# Patient Record
Sex: MALE | Race: WHITE | URBAN - METROPOLITAN AREA
[De-identification: names, ages, dates, MRNs, and addresses within clinical notes are randomized per-mention and may not be internally consistent; named-entity substitution may affect disease eponyms.]

---
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
## 2019-06-05 ENCOUNTER — LABORATORY RESULT (OUTPATIENT)
Age: 19
End: 2019-06-05

## 2019-06-05 ENCOUNTER — APPOINTMENT (OUTPATIENT)
Dept: PULMONOLOGY | Facility: CLINIC | Age: 19
End: 2019-06-05
Payer: COMMERCIAL

## 2019-06-05 VITALS
OXYGEN SATURATION: 98 % | HEIGHT: 70 IN | DIASTOLIC BLOOD PRESSURE: 74 MMHG | WEIGHT: 130 LBS | SYSTOLIC BLOOD PRESSURE: 113 MMHG | HEART RATE: 73 BPM | BODY MASS INDEX: 18.61 KG/M2

## 2019-06-05 DIAGNOSIS — L03.317 CELLULITIS OF BUTTOCK: ICD-10-CM

## 2019-06-05 PROCEDURE — 99205 OFFICE O/P NEW HI 60 MIN: CPT | Mod: 25

## 2019-06-05 PROCEDURE — 94727 GAS DIL/WSHOT DETER LNG VOL: CPT

## 2019-06-05 PROCEDURE — 94729 DIFFUSING CAPACITY: CPT

## 2019-06-05 PROCEDURE — 88738 HGB QUANT TRANSCUTANEOUS: CPT

## 2019-06-05 PROCEDURE — 94060 EVALUATION OF WHEEZING: CPT

## 2019-06-05 PROCEDURE — 36415 COLL VENOUS BLD VENIPUNCTURE: CPT

## 2019-06-05 RX ORDER — DOXYCYCLINE HYCLATE 100 MG/1
100 TABLET ORAL
Qty: 14 | Refills: 0 | Status: ACTIVE | COMMUNITY
Start: 2019-06-05 | End: 1900-01-01

## 2019-06-05 NOTE — DISCUSSION/SUMMARY
[FreeTextEntry1] : Right buttock bug bite, rule out Lyme's disease. Secondly, his admission with evidence of asthma possible secondary to marijuana smoking

## 2019-06-05 NOTE — PHYSICAL EXAM
[General Appearance - Well Developed] : well developed [Normal Appearance] : normal appearance [Well Groomed] : well groomed [General Appearance - Well Nourished] : well nourished [No Deformities] : no deformities [General Appearance - In No Acute Distress] : no acute distress [Normal Oropharynx] : normal oropharynx [Heart Rate And Rhythm] : heart rate and rhythm were normal [Heart Sounds] : normal S1 and S2 [Murmurs] : no murmurs present [FreeTextEntry1] : erythematous rash on right buttock [Nail Clubbing] : no clubbing of the fingernails [Cyanosis, Localized] : no localized cyanosis [Petechial Hemorrhages (___cm)] : no petechial hemorrhages [] : no ischemic changes

## 2019-06-05 NOTE — REASON FOR VISIT
[Initial Evaluation] : an initial evaluation [Cough] : cough [FreeTextEntry2] : Bug bite on right buttock for the last 4 days

## 2019-06-05 NOTE — ASSESSMENT
[FreeTextEntry1] : Venipuncture with labs drawn in office\par Doxycycline for 7 days prescribed.\par Starting him on Symbicort 160/12.5 mcg HFA. \par Strongly advise him on marijuana smoking cessation

## 2019-06-05 NOTE — PROCEDURE
[FreeTextEntry1] : Pulmonary function test performed in my office today: Positive was within normal limits with air trapping; spirometry shows mild obstructive airway disease with significant improvement postbronchodilator, consistent with a reversible bronchospastic disease (asthma); diffusion was within normal limits. SpO2 at rest on room air 98%

## 2019-06-17 LAB
ALBUMIN SERPL ELPH-MCNC: 5 G/DL
ALP BLD-CCNC: 116 U/L
ALT SERPL-CCNC: 10 U/L
ANION GAP SERPL CALC-SCNC: 14 MMOL/L
AST SERPL-CCNC: 17 U/L
B BURGDOR IGG+IGM SER QL IB: NORMAL
BASOPHILS # BLD AUTO: 0.04 K/UL
BASOPHILS NFR BLD AUTO: 0.8 %
BILIRUB SERPL-MCNC: 1.2 MG/DL
BUN SERPL-MCNC: 11 MG/DL
CALCIUM SERPL-MCNC: 9.5 MG/DL
CHLORIDE SERPL-SCNC: 104 MMOL/L
CO2 SERPL-SCNC: 23 MMOL/L
CREAT SERPL-MCNC: 0.86 MG/DL
EOSINOPHIL # BLD AUTO: 0.26 K/UL
EOSINOPHIL NFR BLD AUTO: 5.4 %
ERYTHROCYTE [SEDIMENTATION RATE] IN BLOOD BY WESTERGREN METHOD: 2 MM/HR
GLUCOSE SERPL-MCNC: 86 MG/DL
HCT VFR BLD CALC: 49.2 %
HGB BLD-MCNC: 15.6 G/DL
IMM GRANULOCYTES NFR BLD AUTO: 0.2 %
LYMPHOCYTES # BLD AUTO: 1.78 K/UL
LYMPHOCYTES NFR BLD AUTO: 37 %
MAN DIFF?: NORMAL
MCHC RBC-ENTMCNC: 30.2 PG
MCHC RBC-ENTMCNC: 31.7 GM/DL
MCV RBC AUTO: 95.2 FL
MONOCYTES # BLD AUTO: 0.34 K/UL
MONOCYTES NFR BLD AUTO: 7.1 %
NEUTROPHILS # BLD AUTO: 2.38 K/UL
NEUTROPHILS NFR BLD AUTO: 49.5 %
PLATELET # BLD AUTO: 235 K/UL
POTASSIUM SERPL-SCNC: 4.5 MMOL/L
PROT SERPL-MCNC: 7.4 G/DL
RBC # BLD: 5.17 M/UL
RBC # FLD: 12.7 %
SODIUM SERPL-SCNC: 141 MMOL/L
WBC # FLD AUTO: 4.81 K/UL

## 2019-07-03 ENCOUNTER — APPOINTMENT (OUTPATIENT)
Dept: PULMONOLOGY | Facility: CLINIC | Age: 19
End: 2019-07-03

## 2020-04-23 ENCOUNTER — TRANSCRIPTION ENCOUNTER (OUTPATIENT)
Age: 20
End: 2020-04-23

## 2020-05-19 ENCOUNTER — APPOINTMENT (OUTPATIENT)
Dept: PULMONOLOGY | Facility: CLINIC | Age: 20
End: 2020-05-19
Payer: COMMERCIAL

## 2020-05-19 DIAGNOSIS — J06.9 ACUTE UPPER RESPIRATORY INFECTION, UNSPECIFIED: ICD-10-CM

## 2020-05-19 PROCEDURE — 99214 OFFICE O/P EST MOD 30 MIN: CPT | Mod: 95

## 2020-05-19 RX ORDER — AZITHROMYCIN 500 MG/1
500 TABLET, FILM COATED ORAL DAILY
Qty: 5 | Refills: 0 | Status: ACTIVE | COMMUNITY
Start: 2020-05-19 | End: 1900-01-01

## 2020-05-19 RX ORDER — BUDESONIDE AND FORMOTEROL FUMARATE DIHYDRATE 160; 4.5 UG/1; UG/1
160-4.5 AEROSOL RESPIRATORY (INHALATION) TWICE DAILY
Qty: 1 | Refills: 3 | Status: ACTIVE | COMMUNITY
Start: 2020-05-19 | End: 1900-01-01

## 2020-05-21 NOTE — REVIEW OF SYSTEMS
[Cough] : cough [Sore Throat] : sore throat [SOB on Exertion] : sob on exertion [Negative] : Endocrine

## 2020-05-21 NOTE — ASSESSMENT
[FreeTextEntry1] : advised Jonathan to get cold in 19 PCR tested again\par Starting him on azithromycin and Symbicort 160/4.5 mcg HFA\par Advised him to come into the office to have chest x-ray taken to definitively rule out pneumonia\par \par \par time spent in TeleHealth consultation and charting is 25 minutes

## 2020-05-21 NOTE — HISTORY OF PRESENT ILLNESS
[TextBox_4] : This visit was provided via telehealth using real-time 2-way audio visual technology.  The patient, JOSE KIDD, was located at home, [address]\[place] [address] at the time of the visit.  \par The provider, Tonya Voss, was located at the office [address], [address], [place], [place] at the time of the visit. \par The patient, Mr. JOSE KIDD  and physician Tonya Voss DO, participated in the telehealth encounter.\par \par Verbal consent was obtained by the  from patient.\par

## 2020-05-21 NOTE — REASON FOR VISIT
[Follow-Up] : a follow-up visit [Cough] : cough [TextBox_44] : Jonathan has low-grade fever, sore throat, body ache and mild shortness of breath for the last week. He's had 2 negative COVID19 PCR tests recently

## 2020-05-27 DIAGNOSIS — J45.909 UNSPECIFIED ASTHMA, UNCOMPLICATED: ICD-10-CM

## 2020-05-27 RX ORDER — BUDESONIDE AND FORMOTEROL FUMARATE DIHYDRATE 160; 4.5 UG/1; UG/1
160-4.5 AEROSOL RESPIRATORY (INHALATION)
Qty: 1 | Refills: 1 | Status: ACTIVE | COMMUNITY
Start: 2020-05-27 | End: 1900-01-01

## 2020-06-03 RX ORDER — BUDESONIDE AND FORMOTEROL FUMARATE DIHYDRATE 160; 4.5 UG/1; UG/1
160-4.5 AEROSOL RESPIRATORY (INHALATION)
Qty: 3 | Refills: 3 | Status: ACTIVE | COMMUNITY
Start: 2020-06-03 | End: 1900-01-01

## 2020-12-23 PROBLEM — J06.9 ACUTE URI: Status: RESOLVED | Noted: 2020-05-21 | Resolved: 2020-12-23
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
